# Patient Record
Sex: MALE | Race: BLACK OR AFRICAN AMERICAN | Employment: FULL TIME | URBAN - METROPOLITAN AREA
[De-identification: names, ages, dates, MRNs, and addresses within clinical notes are randomized per-mention and may not be internally consistent; named-entity substitution may affect disease eponyms.]

---

## 2022-08-02 ENCOUNTER — HOSPITAL ENCOUNTER (EMERGENCY)
Age: 32
Discharge: HOME | End: 2022-08-02
Attending: STUDENT IN AN ORGANIZED HEALTH CARE EDUCATION/TRAINING PROGRAM

## 2022-08-02 VITALS
BODY MASS INDEX: 43.07 KG/M2 | TEMPERATURE: 98.1 F | HEART RATE: 89 BPM | RESPIRATION RATE: 16 BRPM | DIASTOLIC BLOOD PRESSURE: 115 MMHG | SYSTOLIC BLOOD PRESSURE: 176 MMHG | HEIGHT: 66 IN | OXYGEN SATURATION: 98 % | WEIGHT: 268 LBS

## 2022-08-02 DIAGNOSIS — L03.317 CELLULITIS OF BUTTOCK: Primary | ICD-10-CM

## 2022-08-02 DIAGNOSIS — R03.0 ELEVATED BLOOD PRESSURE READING: ICD-10-CM

## 2022-08-02 PROCEDURE — 74011250637 HC RX REV CODE- 250/637: Performed by: PHYSICIAN ASSISTANT

## 2022-08-02 PROCEDURE — 99283 EMERGENCY DEPT VISIT LOW MDM: CPT

## 2022-08-02 RX ORDER — SULFAMETHOXAZOLE AND TRIMETHOPRIM 800; 160 MG/1; MG/1
1 TABLET ORAL
Status: COMPLETED | OUTPATIENT
Start: 2022-08-02 | End: 2022-08-02

## 2022-08-02 RX ORDER — CEPHALEXIN 250 MG/1
500 CAPSULE ORAL
Status: COMPLETED | OUTPATIENT
Start: 2022-08-02 | End: 2022-08-02

## 2022-08-02 RX ORDER — CEPHALEXIN 500 MG/1
500 CAPSULE ORAL 2 TIMES DAILY
Qty: 14 CAPSULE | Refills: 0 | Status: SHIPPED | OUTPATIENT
Start: 2022-08-02 | End: 2022-08-09

## 2022-08-02 RX ORDER — ACETAMINOPHEN 500 MG
1000 TABLET ORAL
Status: COMPLETED | OUTPATIENT
Start: 2022-08-02 | End: 2022-08-02

## 2022-08-02 RX ORDER — SULFAMETHOXAZOLE AND TRIMETHOPRIM 800; 160 MG/1; MG/1
1 TABLET ORAL 2 TIMES DAILY
Qty: 14 TABLET | Refills: 0 | Status: SHIPPED | OUTPATIENT
Start: 2022-08-02 | End: 2022-08-09

## 2022-08-02 RX ADMIN — SULFAMETHOXAZOLE AND TRIMETHOPRIM 1 TABLET: 800; 160 TABLET ORAL at 11:18

## 2022-08-02 RX ADMIN — CEPHALEXIN 500 MG: 250 CAPSULE ORAL at 11:18

## 2022-08-02 RX ADMIN — ACETAMINOPHEN 1000 MG: 500 TABLET ORAL at 11:18

## 2022-08-02 NOTE — Clinical Note
[address] [place] [address] Dr [facility] EMERGENCY DEPT  [id number] [address] [phone]    Work/School Note    Date: 8/2/2022    To Whom It May concern:    Jaspreet Dotson was seen and treated today in the emergency room by the following provider(s):  Attending Provider: Rebecca Ceaj DO  Physician Assistant: Kathrine Burnett, Replaced by [facility] John Ibrahim. Jaspreet Dotson is excused from work/school on 08/02/22 and 08/03/22. He is medically clear to return to work/school on 8/4/2022.        Sincerely,          MIGUE Noel

## 2022-08-02 NOTE — ED NOTES
Patient discharge discussed, patient verbalized understanding. Discussed follow up with PCP regarding ED visit and BP management.  Patient ambulatory at discharge, NAD

## 2022-08-02 NOTE — ED TRIAGE NOTES
Patient states that he has a boil on his butt. Patient states he has been dealing with it for 4 days. Patient denies injury.

## 2022-08-02 NOTE — Clinical Note
[address] [place] [address] Dr ONEYDA [facility] EMERGENCY DEPT  [id number] [address] [phone]    Work/School Note    Date: 8/2/2022    To Whom It May concern:    Neha Dotson was seen and treated today in the emergency room by the following provider(s):  Attending Provider: Brenna Salazar DO  Physician Assistant: Ambrosio Cantrell, [facility] Jonh Ibrahim. Neha Dotson is excused from work/school on 08/02/22 and 08/03/22. He is medically clear to return to work/school on 8/4/2022.        Sincerely,          Ana Alvarez PA

## 2022-08-02 NOTE — ED PROVIDER NOTES
EMERGENCY DEPARTMENT HISTORY AND PHYSICAL EXAM    10:14 AM      Date: 8/2/2022  Patient Name: Nader Greene    History of Presenting Illness     Chief Complaint   Patient presents with    Abscess     History Provided By: Patient    Additional History (Context): Nader Greene is a 34 y.o. male with  noted PMH  who presents with complaint of gluteal abscess x4 days. Patient states been applying warm compresses with mild relief. Notes possible drainage from site. Denies fever or chills, abdominal pain, nausea or vomiting. Denies history of diabetes or MRSA. PCP: None    Past History     Past Medical History:  No past medical history on file. Past Surgical History:  No past surgical history on file. Family History:  No family history on file. Social History: Allergies:  No Known Allergies      Review of Systems       Review of Systems   Constitutional:  Negative for chills and fever. Respiratory:  Negative for shortness of breath. Cardiovascular:  Negative for chest pain. Gastrointestinal:  Negative for abdominal pain, nausea and vomiting. Skin:  Positive for color change. Negative for rash. Neurological:  Negative for weakness. All other systems reviewed and are negative. Physical Exam   Visit Vitals  BP (!) 176/115   Pulse 89   Temp 98.1 °F (36.7 °C)   Resp 16   Ht 5' 6\" (1.676 m)   Wt 121.6 kg (268 lb)   SpO2 98%   BMI 43.26 kg/m²         Physical Exam  Vitals and nursing note reviewed. Exam conducted with a chaperone present. Constitutional:       General: He is not in acute distress. Appearance: Normal appearance. He is well-developed. He is not ill-appearing, toxic-appearing or diaphoretic. HENT:      Head: Normocephalic and atraumatic. Cardiovascular:      Rate and Rhythm: Normal rate and regular rhythm. Heart sounds: Normal heart sounds. No murmur heard. No friction rub. No gallop.    Pulmonary:      Effort: Pulmonary effort is normal. No respiratory distress. Breath sounds: Normal breath sounds. No wheezing or rales. Musculoskeletal:         General: Normal range of motion. Cervical back: Normal range of motion and neck supple. Skin:     General: Skin is warm. Findings: No rash. Comments: R gluteal region ~2 cm region of erythema and minimal induration without fluctuance or drainage, moderately TTP    Neurological:      Mental Status: He is alert. Diagnostic Study Results     Labs -  No results found for this or any previous visit (from the past 12 hour(s)). Radiologic Studies -   No orders to display         Medical Decision Making   I am the first provider for this patient. I reviewed the vital signs, available nursing notes, past medical history, past surgical history, family history and social history. Vital Signs-Reviewed the patient's vital signs. Records Reviewed: Nursing Notes and Old Medical Records (Time of Review: 10:14 AM)    ED Course: Progress Notes, Reevaluation, and Consults:  10:14 AM  Reviewed plan with patient. Discussed need for close outpatient follow-up this week for reassessment. Discussed strict return precautions, including fever, increased pain or swelling, or any other medical concerns. One or more blood pressure readings were noted elevated during the patient's presentation in the emergency department today. This abnormal reading has been cited in the patients' diagnosis, and they have been encouraged to follow up with their primary care physician, or referred to a consultant for further evaluation and treatment. Provider Notes (Medical Decision Making): 62-year-old male who presents to the ED due to concern for gluteal abscess. Afebrile, nontoxic-appearing, looks well. Symptoms appear consistent with cellulitis, possible early abscess. No fluctuance or drainage. Do not feel I&D is warranted at this time.   Will discharge with antibiotics, instructions for warm compresses, close outpatient follow-up for further assessment. Strict return precautions provided. Diagnosis     Clinical Impression:   1. Cellulitis of buttock    2. Elevated blood pressure reading        Disposition: home     Follow-up Information       Follow up With Specialties Details Why [address]    [facility] EMERGENCY DEPT Emergency Medicine  If symptoms worsen [address] [address]  [address]  Schedule an appointment as soon as possible for a visit   Milly Mcclure 3  [address]  [phone]             Patient's Medications   Start Taking    CEPHALEXIN (KEFLEX) 500 MG CAPSULE    Take 1 Capsule by mouth two (2) times a day for 7 days. TRIMETHOPRIM-SULFAMETHOXAZOLE (BACTRIM DS) 160-800 MG PER TABLET    Take 1 Tablet by mouth two (2) times a day for 7 days. Continue Taking    No medications on file   These Medications have changed    No medications on file   Stop Taking    No medications on file       Dictation disclaimer:  Please note that this dictation was completed with [employer], the computer voice recognition software. Quite often unanticipated grammatical, syntax, homophones, and other interpretive errors are inadvertently transcribed by the computer software. Please disregard these errors. Please excuse any errors that have escaped final proofreading.

## 2024-11-19 ENCOUNTER — HOSPITAL ENCOUNTER (INPATIENT)
Facility: HOSPITAL | Age: 34
Discharge: HOME | DRG: 066 | End: 2024-11-22

## 2024-11-19 ENCOUNTER — APPOINTMENT (OUTPATIENT)
Facility: HOSPITAL | Age: 34
DRG: 066 | End: 2024-11-19

## 2024-11-19 ENCOUNTER — HOSPITAL ENCOUNTER (INPATIENT)
Facility: HOSPITAL | Age: 34
LOS: 2 days | Discharge: HOME | DRG: 066 | End: 2024-11-21
Attending: EMERGENCY MEDICINE

## 2024-11-19 DIAGNOSIS — I63.9 CEREBROVASCULAR ACCIDENT (CVA), UNSPECIFIED MECHANISM (HCC): ICD-10-CM

## 2024-11-19 DIAGNOSIS — I16.1 HYPERTENSIVE EMERGENCY: ICD-10-CM

## 2024-11-19 DIAGNOSIS — I63.9 ISCHEMIC STROKE (HCC): Primary | ICD-10-CM

## 2024-11-19 PROBLEM — I10 PRIMARY HYPERTENSION: Status: ACTIVE | Noted: 2024-11-19

## 2024-11-19 LAB
ALBUMIN SERPL-MCNC: 4 G/DL (ref 3.4–5)
ALBUMIN/GLOB SERPL: 1.1 (ref 0.8–1.7)
ALP SERPL-CCNC: 96 U/L (ref 45–117)
ALT SERPL-CCNC: 22 U/L (ref 16–61)
ANION GAP SERPL CALC-SCNC: 5 MMOL/L (ref 3–18)
AST SERPL-CCNC: 15 U/L (ref 10–38)
BASOPHILS # BLD: 0 K/UL (ref 0–0.1)
BASOPHILS NFR BLD: 0 % (ref 0–2)
BILIRUB SERPL-MCNC: 0.6 MG/DL (ref 0.2–1)
BUN SERPL-MCNC: 8 MG/DL (ref 7–18)
BUN/CREAT SERPL: 9 (ref 12–20)
CALCIUM SERPL-MCNC: 9.5 MG/DL (ref 8.5–10.1)
CHLORIDE SERPL-SCNC: 109 MMOL/L (ref 100–111)
CHP ED QC CHECK: YES
CO2 SERPL-SCNC: 27 MMOL/L (ref 21–32)
CREAT SERPL-MCNC: 0.93 MG/DL (ref 0.6–1.3)
DIFFERENTIAL METHOD BLD: NORMAL
EKG ATRIAL RATE: 91 BPM
EKG DIAGNOSIS: NORMAL
EKG P AXIS: 40 DEGREES
EKG P-R INTERVAL: 150 MS
EKG Q-T INTERVAL: 372 MS
EKG QRS DURATION: 88 MS
EKG QTC CALCULATION (BAZETT): 457 MS
EKG R AXIS: 76 DEGREES
EKG T AXIS: 83 DEGREES
EKG VENTRICULAR RATE: 91 BPM
EOSINOPHIL # BLD: 0 K/UL (ref 0–0.4)
EOSINOPHIL NFR BLD: 1 % (ref 0–5)
ERYTHROCYTE [DISTWIDTH] IN BLOOD BY AUTOMATED COUNT: 12.7 % (ref 11.6–14.5)
GLOBULIN SER CALC-MCNC: 3.8 G/DL (ref 2–4)
GLUCOSE BLD STRIP.AUTO-MCNC: 111 MG/DL (ref 70–110)
GLUCOSE BLD-MCNC: 111 MG/DL
GLUCOSE SERPL-MCNC: 104 MG/DL (ref 74–99)
HCT VFR BLD AUTO: 45.3 % (ref 36–48)
HGB BLD-MCNC: 15.4 G/DL (ref 13–16)
IMM GRANULOCYTES # BLD AUTO: 0 K/UL (ref 0–0.04)
IMM GRANULOCYTES NFR BLD AUTO: 0 % (ref 0–0.5)
INR PPP: 1 (ref 0.9–1.1)
LYMPHOCYTES # BLD: 1.7 K/UL (ref 0.9–3.6)
LYMPHOCYTES NFR BLD: 26 % (ref 21–52)
MCH RBC QN AUTO: 28.3 PG (ref 24–34)
MCHC RBC AUTO-ENTMCNC: 34 G/DL (ref 31–37)
MCV RBC AUTO: 83.1 FL (ref 78–100)
MONOCYTES # BLD: 0.5 K/UL (ref 0.05–1.2)
MONOCYTES NFR BLD: 7 % (ref 3–10)
NEUTS SEG # BLD: 4.2 K/UL (ref 1.8–8)
NEUTS SEG NFR BLD: 65 % (ref 40–73)
NRBC # BLD: 0 K/UL (ref 0–0.01)
NRBC BLD-RTO: 0 PER 100 WBC
PLATELET # BLD AUTO: 272 K/UL (ref 135–420)
PMV BLD AUTO: 11.3 FL (ref 9.2–11.8)
POTASSIUM SERPL-SCNC: 4.2 MMOL/L (ref 3.5–5.5)
PROT SERPL-MCNC: 7.8 G/DL (ref 6.4–8.2)
PROTHROMBIN TIME: 13.4 SEC (ref 11.9–14.9)
RBC # BLD AUTO: 5.45 M/UL (ref 4.35–5.65)
SODIUM SERPL-SCNC: 141 MMOL/L (ref 136–145)
TROPONIN I SERPL HS-MCNC: 17 NG/L (ref 0–78)
WBC # BLD AUTO: 6.5 K/UL (ref 4.6–13.2)

## 2024-11-19 PROCEDURE — 70496 CT ANGIOGRAPHY HEAD: CPT

## 2024-11-19 PROCEDURE — 85025 COMPLETE CBC W/AUTO DIFF WBC: CPT

## 2024-11-19 PROCEDURE — 70450 CT HEAD/BRAIN W/O DYE: CPT

## 2024-11-19 PROCEDURE — 80053 COMPREHEN METABOLIC PANEL: CPT

## 2024-11-19 PROCEDURE — 99222 1ST HOSP IP/OBS MODERATE 55: CPT | Performed by: PHYSICIAN ASSISTANT

## 2024-11-19 PROCEDURE — 93005 ELECTROCARDIOGRAM TRACING: CPT | Performed by: EMERGENCY MEDICINE

## 2024-11-19 PROCEDURE — 6370000000 HC RX 637 (ALT 250 FOR IP): Performed by: PHYSICIAN ASSISTANT

## 2024-11-19 PROCEDURE — 99285 EMERGENCY DEPT VISIT HI MDM: CPT

## 2024-11-19 PROCEDURE — 82962 GLUCOSE BLOOD TEST: CPT

## 2024-11-19 PROCEDURE — 6360000002 HC RX W HCPCS: Performed by: PHYSICIAN ASSISTANT

## 2024-11-19 PROCEDURE — 6370000000 HC RX 637 (ALT 250 FOR IP): Performed by: EMERGENCY MEDICINE

## 2024-11-19 PROCEDURE — 70551 MRI BRAIN STEM W/O DYE: CPT

## 2024-11-19 PROCEDURE — 1100000003 HC PRIVATE W/ TELEMETRY

## 2024-11-19 PROCEDURE — 70498 CT ANGIOGRAPHY NECK: CPT

## 2024-11-19 PROCEDURE — 84484 ASSAY OF TROPONIN QUANT: CPT

## 2024-11-19 PROCEDURE — 2580000003 HC RX 258: Performed by: EMERGENCY MEDICINE

## 2024-11-19 PROCEDURE — 6360000004 HC RX CONTRAST MEDICATION: Performed by: EMERGENCY MEDICINE

## 2024-11-19 PROCEDURE — 6360000002 HC RX W HCPCS: Performed by: EMERGENCY MEDICINE

## 2024-11-19 PROCEDURE — 93010 ELECTROCARDIOGRAM REPORT: CPT | Performed by: INTERNAL MEDICINE

## 2024-11-19 PROCEDURE — 85610 PROTHROMBIN TIME: CPT

## 2024-11-19 RX ORDER — ONDANSETRON 4 MG/1
4 TABLET, ORALLY DISINTEGRATING ORAL EVERY 8 HOURS PRN
Status: DISCONTINUED | OUTPATIENT
Start: 2024-11-19 | End: 2024-11-21 | Stop reason: HOSPADM

## 2024-11-19 RX ORDER — CLOPIDOGREL 300 MG/1
300 TABLET, FILM COATED ORAL
Status: COMPLETED | OUTPATIENT
Start: 2024-11-19 | End: 2024-11-19

## 2024-11-19 RX ORDER — ASPIRIN 300 MG/1
300 SUPPOSITORY RECTAL DAILY
Status: DISCONTINUED | OUTPATIENT
Start: 2024-11-20 | End: 2024-11-21

## 2024-11-19 RX ORDER — SODIUM CHLORIDE 9 MG/ML
INJECTION, SOLUTION INTRAVENOUS CONTINUOUS
Status: DISCONTINUED | OUTPATIENT
Start: 2024-11-19 | End: 2024-11-19

## 2024-11-19 RX ORDER — ONDANSETRON 2 MG/ML
4 INJECTION INTRAMUSCULAR; INTRAVENOUS EVERY 6 HOURS PRN
Status: DISCONTINUED | OUTPATIENT
Start: 2024-11-19 | End: 2024-11-21 | Stop reason: HOSPADM

## 2024-11-19 RX ORDER — LABETALOL HYDROCHLORIDE 5 MG/ML
10 INJECTION, SOLUTION INTRAVENOUS ONCE
Status: COMPLETED | OUTPATIENT
Start: 2024-11-19 | End: 2024-11-19

## 2024-11-19 RX ORDER — ASPIRIN 300 MG/1
300 SUPPOSITORY RECTAL DAILY
Status: DISCONTINUED | OUTPATIENT
Start: 2024-11-19 | End: 2024-11-19

## 2024-11-19 RX ORDER — ATORVASTATIN CALCIUM 40 MG/1
80 TABLET, FILM COATED ORAL NIGHTLY
Status: DISCONTINUED | OUTPATIENT
Start: 2024-11-19 | End: 2024-11-21 | Stop reason: HOSPADM

## 2024-11-19 RX ORDER — LABETALOL HYDROCHLORIDE 5 MG/ML
10 INJECTION, SOLUTION INTRAVENOUS EVERY 10 MIN PRN
Status: DISCONTINUED | OUTPATIENT
Start: 2024-11-19 | End: 2024-11-21 | Stop reason: HOSPADM

## 2024-11-19 RX ORDER — ASPIRIN 81 MG/1
81 TABLET, CHEWABLE ORAL DAILY
Status: DISCONTINUED | OUTPATIENT
Start: 2024-11-20 | End: 2024-11-21

## 2024-11-19 RX ORDER — ASPIRIN 81 MG/1
81 TABLET, CHEWABLE ORAL DAILY
Status: DISCONTINUED | OUTPATIENT
Start: 2024-11-19 | End: 2024-11-19

## 2024-11-19 RX ORDER — IOPAMIDOL 755 MG/ML
80 INJECTION, SOLUTION INTRAVASCULAR
Status: COMPLETED | OUTPATIENT
Start: 2024-11-19 | End: 2024-11-19

## 2024-11-19 RX ORDER — ACETAMINOPHEN 325 MG/1
650 TABLET ORAL EVERY 4 HOURS PRN
Status: DISCONTINUED | OUTPATIENT
Start: 2024-11-19 | End: 2024-11-21 | Stop reason: HOSPADM

## 2024-11-19 RX ORDER — ENOXAPARIN SODIUM 100 MG/ML
30 INJECTION SUBCUTANEOUS 2 TIMES DAILY
Status: DISCONTINUED | OUTPATIENT
Start: 2024-11-19 | End: 2024-11-21 | Stop reason: HOSPADM

## 2024-11-19 RX ORDER — ASPIRIN 325 MG
325 TABLET ORAL ONCE
Status: COMPLETED | OUTPATIENT
Start: 2024-11-19 | End: 2024-11-19

## 2024-11-19 RX ORDER — POLYETHYLENE GLYCOL 3350 17 G/17G
17 POWDER, FOR SOLUTION ORAL DAILY PRN
Status: DISCONTINUED | OUTPATIENT
Start: 2024-11-19 | End: 2024-11-21 | Stop reason: HOSPADM

## 2024-11-19 RX ADMIN — LABETALOL HYDROCHLORIDE 10 MG: 5 INJECTION, SOLUTION INTRAVENOUS at 14:30

## 2024-11-19 RX ADMIN — LABETALOL HYDROCHLORIDE 10 MG: 5 INJECTION, SOLUTION INTRAVENOUS at 12:51

## 2024-11-19 RX ADMIN — ATORVASTATIN CALCIUM 80 MG: 40 TABLET, FILM COATED ORAL at 21:50

## 2024-11-19 RX ADMIN — IOPAMIDOL 80 ML: 755 INJECTION, SOLUTION INTRAVENOUS at 11:51

## 2024-11-19 RX ADMIN — SODIUM CHLORIDE: 9 INJECTION, SOLUTION INTRAVENOUS at 12:10

## 2024-11-19 RX ADMIN — CLOPIDOGREL BISULFATE 300 MG: 300 TABLET, FILM COATED ORAL at 12:16

## 2024-11-19 RX ADMIN — ASPIRIN 325 MG: 325 TABLET ORAL at 12:16

## 2024-11-19 RX ADMIN — ENOXAPARIN SODIUM 30 MG: 100 INJECTION SUBCUTANEOUS at 21:00

## 2024-11-19 ASSESSMENT — PAIN SCALES - GENERAL
PAINLEVEL_OUTOF10: 0
PAINLEVEL_OUTOF10: 0

## 2024-11-19 ASSESSMENT — LIFESTYLE VARIABLES
HOW MANY STANDARD DRINKS CONTAINING ALCOHOL DO YOU HAVE ON A TYPICAL DAY: PATIENT DOES NOT DRINK
HOW OFTEN DO YOU HAVE A DRINK CONTAINING ALCOHOL: NEVER

## 2024-11-19 NOTE — PROGRESS NOTES
MRI screening form needs to be filled out and faxed to [phone] BEFORE MRI can be scheduled.  If unable to obtain information from patient , [employer] needs to be contacted . If patient is claustrophobic or will needs pain meds, please have ordered in advance in order to facilitate exam.

## 2024-11-19 NOTE — H&P
History and Physical          Subjective     HPI: Lisa Aguilar is a 34 y.o. male with a PMHx of HTN who presented to the ED with c/o LUE numbness that started last night while he was on the toilet associated with aphasia/slurred speech. He states he was playing a game on his phone when he noticed his arm become numb and the dexterity decrease in his hand. He tried to call his family, but he was unable to type on his phone. He tried to say \"ok google\" to try to call someone by voice commands, but he was unable to speak/get the words out so it made sense. When he tried to get up, he felt off balance. Symptoms lasted a few minutes and resolved. He does not have a primary care physician or take any home medications, but was told previously he has HTN. His grandparents and mother had HTN and ESRD on dialysis. He denies vision changes, headache, LE weakness/numbness, vertigo. He does not have a PCP. He drinks occasionally, smokes black and milds.    In the ED, BP elevated up to 223/128. Labs with no acute abnormalities. CTH negative for intracranial abnormality. CTA negative for LVO.     PMHx:  Past Medical History:   Diagnosis Date    Primary hypertension 11/19/2024       PSurgHx:  No past surgical history on file.    SocialHx:  Social History     Socioeconomic History    Marital status: Single   Tobacco Use    Smoking status: Every Day     Types: Cigars    Smokeless tobacco: Never   Vaping Use    Vaping status: Never Used     Social Determinants of Health     Food Insecurity: No Food Insecurity (11/19/2024)    Hunger Vital Sign     Worried About Running Out of Food in the Last Year: Never true     Ran Out of Food in the Last Year: Never true   Transportation Needs: No Transportation Needs (11/19/2024)    PRAPARE - Transportation     Lack of Transportation (Medical): No     Lack of Transportation (Non-Medical): No   Housing Stability: Low Risk  (11/19/2024)    Housing Stability Vital Sign     Unable to Pay for  91 BPM    P-R Interval 150 ms    QRS Duration 88 ms    Q-T Interval 372 ms    QTc Calculation (Bazett) 457 ms    P Axis 40 degrees    R Axis 76 degrees    T Axis 83 degrees    Diagnosis       Normal sinus rhythm  Normal ECG  No previous ECGs available  Confirmed by MD Kevin, Giacomo ([id number]) on 11/19/2024 1:01:58 PM         Imaging Reviewed:  CT HEAD WO CONTRAST    Result Date: 11/19/2024  CT OF THE HEAD WITHOUT CONTRAST CPT CODE: 26537 PROVIDED REASON FOR EXAM: Stroke ADDITIONAL HISTORY: According to the chart notes patient with LEFT upper extremity NUMBNESS. Also reported DIZZINESS. CODE S STROKE ALERT COMPARISON: None TECHNIQUE:  Axial CT images of the head from the skull base to the vertex without IV contrast. CT dose reduction was achieved through use of a standardized protocol tailored for this examination and automatic exposure control for dose modulation. FINDINGS: Negative for large territory acute cortical infarct. No findings of acute intracranial hemorrhage. Overall the brain attenuations appear normal. No findings of mass lesion, no chronic infarct. Normal CSF spaces, no hydrocephalus or abnormal extra-axial fluid collection. Normal craniocervical junction, no findings of Chiari malformation. Partially imaged paranasal sinuses show polypoid density in the right nasal passage that could be sinonasal polyp. Ovoid 18 x 13 mm opacity at the region of the superior aspect of the right maxillary sinus or maxillary infundibulum. Could be a mucous retention cyst or a small mucocele.     1. No acute intracranial abnormality. The brain appears normal. 2. Polypoid density in the right nasal passage that could be a nasal polyp. Rounded density at the region of the right maxillary sinus infundibulum. Could be mucous retention cyst or mucocele, the sinuses are incompletely imaged. Findings called to Dr. Contreras in the emergency department at 12:04 p.m., 11/19/2024. Electronically signed by Earnest Leon    CTA HEAD NECK W WO

## 2024-11-19 NOTE — ED NOTES
TRANSFER - OUT REPORT:    Verbal report given to Lola Joyce RN  on Lisa Aguilar  being transferred to [place] [address] for routine progression of patient care       Report consisted of patient's Situation, Background, Assessment and   Recommendations(SBAR).     Information from the following report(s) Nurse Handoff Report was reviewed with the receiving nurse.    [place] Fall Assessment:    Presents to emergency department  because of falls (Syncope, seizure, or loss of consciousness): Yes  Age > 70: No  Altered Mental Status, Intoxication with alcohol or substance confusion (Disorientation, impaired judgment, poor safety awaremess, or inability to follow instructions): No  Impaired Mobility: Ambulates or transfers with assistive devices or assistance; Unable to ambulate or transer.: No  Nursing Judgement: Yes          Lines:   Peripheral IV 11/19/24 Distal;Right Cephalic (Active)        Opportunity for questions and clarification was provided.      Patient transported with:  Monitor

## 2024-11-19 NOTE — ED PROVIDER NOTES
[facility] EMERGENCY DEPT  EMERGENCY DEPARTMENT HISTORY AND PHYSICAL EXAM      Pt Name: Lisa Aguilar  MRN: [id number]  Birthdate 1990  Date of evaluation: 11/19/2024  Provider: Vic Contreras DO    CHIEF COMPLAINT       Chief Complaint   Patient presents with    Numbness    Dizziness         HISTORY OF PRESENT ILLNESS   (Location/Symptom, Timing/Onset, Context/Setting, Quality, Duration, Modifying Factors, Severity)  Note limiting factors.   Lisa Aguilar is a 34 y.o. male who presents to the emergency department with chief complaint of numbness of his left upper extremity that started at midnight last night while he was on toilet.  He says he had associated dizziness and felt confused.  He reports that the dizziness and confusion have resolved.  Today he has left hand numbness with the arm numbness being resolved.  He says that his sibling and family encouraged him to come to be evaluated last night but he waited until this morning.  He does report that he was told in the past that he has high blood pressure but does not have a primary care doctor and has not seen a doctor in a long time.  No headache, neck pain, and no trauma, or visual changes, chest pain, shortness of breath, leg pain, abdominal pain, nausea, fever, focal weakness, and no other complaints.    The history is provided by the patient. No  was used.       Nursing Notes were reviewed.    REVIEW OF SYSTEMS    (2-9 systems for level 4, 10 or more for level 5)     Review of Systems    Except as noted above in the HPI, the remainder of the review of systems was reviewed and negative.       PAST MEDICAL HISTORY   No past medical history on file.      SURGICAL HISTORY     No past surgical history on file.      CURRENT MEDICATIONS       Previous Medications    No medications on file       ALLERGIES     Patient has no known allergies.    FAMILY HISTORY     No family history on file.       SOCIAL HISTORY       Social History  studies/labs  -re-evaluations  -documentation time    Aggregate critical care time was 53 minutes, which includes only time during which I was engaged in work directly related to the patient's care as described above, whether I was at bedside or elsewhere in the Emergency Department. It did not include time spent performing other reported procedures or the services of residents, students, nurses, or advance practice providers.     ~Dr. Contreras            FINAL IMPRESSION      1. Ischemic stroke (HCC)    2. Hypertensive emergency          DISPOSITION/PLAN   DISPOSITION Admitted 11/19/2024 12:45:36 PM      PATIENT REFERRED TO:  No follow-up provider specified.    DISCHARGE MEDICATIONS:  New Prescriptions    No medications on file     Controlled Substances Monitoring:          No data to display                Dictation disclaimer: Please note that this dictation was completed with [employer], the computer voice recognition software.  Quite often unanticipated grammatical, syntax, homophones, and other interpretive errors are inadvertently transcribed by the computer software.  Please disregard these errors.  Please excuse any errors that have escaped final proofreading.    My signature above authenticates this document and my orders, the final    diagnosis (es), discharge prescription (s), and instructions in the Epic    record.       Vic Contreras DO (electronically signed)  Attending Emergency Physician          Vic Contreras DO  11/19/24 [id number]

## 2024-11-19 NOTE — ED TRIAGE NOTES
Ambulatory pt brought to stroke stretcher for immediate provider evaluation for left upper extremity numbness that started last night and dizziness today.   Code S called at 1136  Taken to CT 1137  Finger stick glucose 111

## 2024-11-19 NOTE — PROGRESS NOTES
4 Eyes Skin Assessment     NAME:  Lisa Aguilar  YOB: 1990  MEDICAL RECORD NUMBER:  [id number]    The patient is being assessed for  Admission    I agree that at least one RN has performed a thorough Head to Toe Skin Assessment on the patient. ALL assessment sites listed below have been assessed.      Areas assessed by both nurses:    Head, Face, Ears, Shoulders, Back, Chest, Arms, Elbows, Hands, Sacrum. Buttock, Coccyx, Ischium, and Legs. Feet and Heels        Does the Patient have a Wound? No noted wound(s)       Nitin Prevention initiated by RN: No  Wound Care Orders initiated by RN: No    Pressure Injury (Stage 3,4, Unstageable, DTI, NWPT, and Complex wounds) if present, place Wound referral order by RN under : No    New Ostomies, if present place, Ostomy referral order under : No     Nurse 1 eSignature: Electronically signed by Mariel Joyce RN on 11/19/24 at 5:29 PM EST    **SHARE this note so that the co-signing nurse can place an eSignature**    Nurse 2 eSignature: {Esignature:[id number]}

## 2024-11-19 NOTE — FLOWSHEET NOTE
11/19/24 1341   Vitals   Pulse 81   Respirations 17   BP (!) 187/123   MAP (Calculated) 144   MAP (mmHg) 139   Oxygen Therapy   SpO2 97 %   Pulse via Oximetry 82 beats per minute   NIH Stroke Scale   Interval Reassessment   Level of Consciousness (1a) 0   LOC Questions (1b) 0   LOC Commands (1c) 0   Best Gaze (2) 0   Visual (3) 0   Facial Palsy (4) 0   Motor Arm, Left (5a) 0   Motor Arm, Right (5b) 0   Motor Leg, Left (6a) 0   Motor Leg, Right (6b) 0   Limb Ataxia (7) 0   Sensory (8) (!) 1   Best Language (9) 0   Dysarthria (10) 0   Extinction and Inattention (11) 0   Total 1     Md AWARE of clinical findings.

## 2024-11-19 NOTE — FLOWSHEET NOTE
11/19/24 1215   Vital Signs   Pulse 96   Respirations 21   BP (!) 197/145   MAP (Calculated) 162   MAP (mmHg) 157   Oxygen Therapy   SpO2 95 %   Pulse via Oximetry 95 beats per minute     Md MADE AWARE OF FINDINGS.

## 2024-11-20 ENCOUNTER — APPOINTMENT (OUTPATIENT)
Facility: HOSPITAL | Age: 34
DRG: 066 | End: 2024-11-20

## 2024-11-20 LAB
ANION GAP SERPL CALC-SCNC: 6 MMOL/L (ref 3–18)
BUN SERPL-MCNC: 8 MG/DL (ref 7–18)
BUN/CREAT SERPL: 8 (ref 12–20)
CALCIUM SERPL-MCNC: 9.1 MG/DL (ref 8.5–10.1)
CHLORIDE SERPL-SCNC: 109 MMOL/L (ref 100–111)
CHOLEST SERPL-MCNC: 134 MG/DL
CO2 SERPL-SCNC: 27 MMOL/L (ref 21–32)
CREAT SERPL-MCNC: 0.99 MG/DL (ref 0.6–1.3)
ECHO AO ASC DIAM: 3.4 CM
ECHO AO ASCENDING AORTA INDEX: 1.54 CM/M2
ECHO AO ROOT DIAM: 3.2 CM
ECHO AO ROOT INDEX: 1.45 CM/M2
ECHO AV AREA PEAK VELOCITY: 2 CM2
ECHO AV AREA VTI: 1.7 CM2
ECHO AV AREA/BSA PEAK VELOCITY: 0.9 CM2/M2
ECHO AV AREA/BSA VTI: 0.8 CM2/M2
ECHO AV MEAN GRADIENT: 2 MMHG
ECHO AV MEAN VELOCITY: 0.7 M/S
ECHO AV PEAK GRADIENT: 4 MMHG
ECHO AV PEAK VELOCITY: 1 M/S
ECHO AV VELOCITY RATIO: 0.6
ECHO AV VTI: 20.2 CM
ECHO BSA: 2.31 M2
ECHO LA VOL A-L A2C: 76 ML (ref 18–58)
ECHO LA VOL A-L A4C: 66 ML (ref 18–58)
ECHO LA VOL BP: 68 ML (ref 18–58)
ECHO LA VOL MOD A2C: 74 ML (ref 18–58)
ECHO LA VOL MOD A4C: 62 ML (ref 18–58)
ECHO LA VOL/BSA BIPLANE: 31 ML/M2 (ref 16–34)
ECHO LA VOLUME AREA LENGTH: 71 ML
ECHO LA VOLUME INDEX A-L A2C: 34 ML/M2 (ref 16–34)
ECHO LA VOLUME INDEX A-L A4C: 30 ML/M2 (ref 16–34)
ECHO LA VOLUME INDEX AREA LENGTH: 32 ML/M2 (ref 16–34)
ECHO LA VOLUME INDEX MOD A2C: 33 ML/M2 (ref 16–34)
ECHO LA VOLUME INDEX MOD A4C: 28 ML/M2 (ref 16–34)
ECHO LV E' LATERAL VELOCITY: 6.43 CM/S
ECHO LV E' SEPTAL VELOCITY: 4.7 CM/S
ECHO LV EDV A2C: 139 ML
ECHO LV EDV A4C: 134 ML
ECHO LV EDV BP: 138 ML (ref 67–155)
ECHO LV EDV INDEX A4C: 61 ML/M2
ECHO LV EDV INDEX BP: 62 ML/M2
ECHO LV EDV NDEX A2C: 63 ML/M2
ECHO LV EJECTION FRACTION A2C: 58 %
ECHO LV EJECTION FRACTION A4C: 56 %
ECHO LV EJECTION FRACTION BIPLANE: 58 % (ref 55–100)
ECHO LV ESV A2C: 58 ML
ECHO LV ESV A4C: 59 ML
ECHO LV ESV BP: 59 ML (ref 22–58)
ECHO LV ESV INDEX A2C: 26 ML/M2
ECHO LV ESV INDEX A4C: 27 ML/M2
ECHO LV ESV INDEX BP: 27 ML/M2
ECHO LV FRACTIONAL SHORTENING: 26 % (ref 28–44)
ECHO LV INTERNAL DIMENSION DIASTOLE INDEX: 2.44 CM/M2
ECHO LV INTERNAL DIMENSION DIASTOLIC: 5.4 CM (ref 4.2–5.9)
ECHO LV INTERNAL DIMENSION SYSTOLIC INDEX: 1.81 CM/M2
ECHO LV INTERNAL DIMENSION SYSTOLIC: 4 CM
ECHO LV IVSD: 1.3 CM (ref 0.6–1)
ECHO LV MASS 2D: 279.8 G (ref 88–224)
ECHO LV MASS INDEX 2D: 126.6 G/M2 (ref 49–115)
ECHO LV POSTERIOR WALL DIASTOLIC: 1.2 CM (ref 0.6–1)
ECHO LV RELATIVE WALL THICKNESS RATIO: 0.44
ECHO LVOT AREA: 3.5 CM2
ECHO LVOT AV VTI INDEX: 0.49
ECHO LVOT DIAM: 2.1 CM
ECHO LVOT MEAN GRADIENT: 1 MMHG
ECHO LVOT PEAK GRADIENT: 1 MMHG
ECHO LVOT PEAK VELOCITY: 0.6 M/S
ECHO LVOT STROKE VOLUME INDEX: 15.4 ML/M2
ECHO LVOT SV: 33.9 ML
ECHO LVOT VTI: 9.8 CM
ECHO MV A VELOCITY: 0.64 M/S
ECHO MV E DECELERATION TIME (DT): 156.9 MS
ECHO MV E VELOCITY: 1.1 M/S
ECHO MV E/A RATIO: 1.72
ECHO MV E/E' LATERAL: 17.11
ECHO MV E/E' RATIO (AVERAGED): 20.26
ECHO MV E/E' SEPTAL: 23.4
ECHO PV MAX VELOCITY: 0.7 M/S
ECHO PV PEAK GRADIENT: 2 MMHG
ECHO RA AREA 4C: 14.5 CM2
ECHO RV BASAL DIMENSION: 4 CM
ECHO RV TAPSE: 2.1 CM (ref 1.7–?)
ERYTHROCYTE [DISTWIDTH] IN BLOOD BY AUTOMATED COUNT: 12.7 % (ref 11.6–14.5)
EST. AVERAGE GLUCOSE BLD GHB EST-MCNC: 128 MG/DL
GLUCOSE SERPL-MCNC: 124 MG/DL (ref 74–99)
HBA1C MFR BLD: 6.1 % (ref 4.2–5.6)
HCT VFR BLD AUTO: 43.2 % (ref 36–48)
HDLC SERPL-MCNC: 21 MG/DL (ref 40–60)
HDLC SERPL: 6.4 (ref 0–5)
HGB BLD-MCNC: 14.1 G/DL (ref 13–16)
LDLC SERPL CALC-MCNC: 83.6 MG/DL (ref 0–100)
LIPID PANEL: ABNORMAL
MCH RBC QN AUTO: 27.7 PG (ref 24–34)
MCHC RBC AUTO-ENTMCNC: 32.6 G/DL (ref 31–37)
MCV RBC AUTO: 84.9 FL (ref 78–100)
NRBC # BLD: 0 K/UL (ref 0–0.01)
NRBC BLD-RTO: 0 PER 100 WBC
PLATELET # BLD AUTO: 218 K/UL (ref 135–420)
PMV BLD AUTO: 12.3 FL (ref 9.2–11.8)
POTASSIUM SERPL-SCNC: 3.3 MMOL/L (ref 3.5–5.5)
RBC # BLD AUTO: 5.09 M/UL (ref 4.35–5.65)
SODIUM SERPL-SCNC: 142 MMOL/L (ref 136–145)
TRIGL SERPL-MCNC: 147 MG/DL
VLDLC SERPL CALC-MCNC: 29.4 MG/DL
WBC # BLD AUTO: 6.4 K/UL (ref 4.6–13.2)

## 2024-11-20 PROCEDURE — 93306 TTE W/DOPPLER COMPLETE: CPT | Performed by: INTERNAL MEDICINE

## 2024-11-20 PROCEDURE — 99232 SBSQ HOSP IP/OBS MODERATE 35: CPT | Performed by: INTERNAL MEDICINE

## 2024-11-20 PROCEDURE — 80061 LIPID PANEL: CPT

## 2024-11-20 PROCEDURE — 6370000000 HC RX 637 (ALT 250 FOR IP): Performed by: PHYSICIAN ASSISTANT

## 2024-11-20 PROCEDURE — 36415 COLL VENOUS BLD VENIPUNCTURE: CPT

## 2024-11-20 PROCEDURE — 92526 ORAL FUNCTION THERAPY: CPT

## 2024-11-20 PROCEDURE — 97165 OT EVAL LOW COMPLEX 30 MIN: CPT

## 2024-11-20 PROCEDURE — 85027 COMPLETE CBC AUTOMATED: CPT

## 2024-11-20 PROCEDURE — 94761 N-INVAS EAR/PLS OXIMETRY MLT: CPT

## 2024-11-20 PROCEDURE — 1100000003 HC PRIVATE W/ TELEMETRY

## 2024-11-20 PROCEDURE — 93306 TTE W/DOPPLER COMPLETE: CPT

## 2024-11-20 PROCEDURE — 83036 HEMOGLOBIN GLYCOSYLATED A1C: CPT

## 2024-11-20 PROCEDURE — 6370000000 HC RX 637 (ALT 250 FOR IP): Performed by: INTERNAL MEDICINE

## 2024-11-20 PROCEDURE — 6360000002 HC RX W HCPCS: Performed by: PHYSICIAN ASSISTANT

## 2024-11-20 PROCEDURE — 80048 BASIC METABOLIC PNL TOTAL CA: CPT

## 2024-11-20 PROCEDURE — 92610 EVALUATE SWALLOWING FUNCTION: CPT

## 2024-11-20 RX ORDER — POTASSIUM CHLORIDE 1500 MG/1
40 TABLET, EXTENDED RELEASE ORAL ONCE
Status: COMPLETED | OUTPATIENT
Start: 2024-11-20 | End: 2024-11-20

## 2024-11-20 RX ORDER — NIFEDIPINE 30 MG/1
30 TABLET, EXTENDED RELEASE ORAL DAILY
Status: DISCONTINUED | OUTPATIENT
Start: 2024-11-20 | End: 2024-11-21 | Stop reason: HOSPADM

## 2024-11-20 RX ADMIN — ACETAMINOPHEN 325MG 650 MG: 325 TABLET ORAL at 21:22

## 2024-11-20 RX ADMIN — NIFEDIPINE 30 MG: 30 TABLET, FILM COATED, EXTENDED RELEASE ORAL at 10:41

## 2024-11-20 RX ADMIN — ENOXAPARIN SODIUM 30 MG: 100 INJECTION SUBCUTANEOUS at 09:31

## 2024-11-20 RX ADMIN — ENOXAPARIN SODIUM 30 MG: 100 INJECTION SUBCUTANEOUS at 21:23

## 2024-11-20 RX ADMIN — POTASSIUM CHLORIDE 40 MEQ: 1500 TABLET, EXTENDED RELEASE ORAL at 18:01

## 2024-11-20 RX ADMIN — ATORVASTATIN CALCIUM 80 MG: 40 TABLET, FILM COATED ORAL at 21:23

## 2024-11-20 RX ADMIN — ASPIRIN 81 MG CHEWABLE TABLET 81 MG: 81 TABLET CHEWABLE at 09:31

## 2024-11-20 ASSESSMENT — PAIN DESCRIPTION - DESCRIPTORS: DESCRIPTORS: ACHING

## 2024-11-20 ASSESSMENT — PAIN SCALES - GENERAL
PAINLEVEL_OUTOF10: 0
PAINLEVEL_OUTOF10: 0
PAINLEVEL_OUTOF10: 6
PAINLEVEL_OUTOF10: 0

## 2024-11-20 ASSESSMENT — PAIN DESCRIPTION - FREQUENCY: FREQUENCY: INTERMITTENT

## 2024-11-20 ASSESSMENT — PAIN - FUNCTIONAL ASSESSMENT: PAIN_FUNCTIONAL_ASSESSMENT: ACTIVITIES ARE NOT PREVENTED

## 2024-11-20 ASSESSMENT — PAIN DESCRIPTION - PAIN TYPE: TYPE: ACUTE PAIN

## 2024-11-20 ASSESSMENT — PAIN DESCRIPTION - ONSET: ONSET: ON-GOING

## 2024-11-20 ASSESSMENT — PAIN DESCRIPTION - ORIENTATION: ORIENTATION: LEFT

## 2024-11-20 NOTE — CONSULTS
[facility]  Department of Neurology  Chief Complaint   Patient presents with    Numbness    Dizziness       HPI:   HPI: Lisa Aguilar is a 34 y.o. male with a PMHx of HTN not on any antihypertensives who came to hospital for recurrent numbness at left hand/fingers. Around 11:30 pm Monday, patient finished shower and toilet. He felt numbness and tingling at left hand/fingers when he played games after he came out of the bathroom. He tried to use his right hand to call, but his right hand became clumsy. Finally he called to his sister who think patient may have stroke, and suggest hospital visit. He then called his child mother to send him to hospital. She put her baby to sleep and then came to his place, but patient already fell asleep. Around 10 am of admission day, patient woke up without any symptoms. He drove out and had recurrent numbness at left hand/fingers. He decided to come to hospital directly, where he was found hypertensive 223/128.      All symptoms last for less than one hour and resolved completely. He is asymptomatic now. CTH negative for intracranial abnormality. CTA negative for LVO.     Past Medical History:   Diagnosis Date    Primary hypertension 11/19/2024       No past surgical history on file.    Current Facility-Administered Medications   Medication Dose Route Frequency Provider Last Rate Last Admin    NIFEdipine (PROCARDIA XL) extended release tablet 30 mg  30 mg Oral Daily Josiah Carr MD   30 mg at 11/20/24 1041    ondansetron (ZOFRAN-ODT) disintegrating tablet 4 mg  4 mg Oral Q8H PRN Misty Pryor PA        Or    ondansetron (ZOFRAN) injection 4 mg  4 mg IntraVENous Q6H PRN Misty Pryor PA        polyethylene glycol (GLYCOLAX) packet 17 g  17 g Oral Daily PRN Misty Pryor PA        enoxaparin Sodium (LOVENOX) injection 30 mg  30 mg SubCUTAneous BID Misty Pryor PA   30 mg at 11/20/24 0931    atorvastatin (LIPITOR) tablet 80 mg  80

## 2024-11-20 NOTE — PLAN OF CARE
Problem: Chronic Conditions and Co-morbidities  Goal: Patient's chronic conditions and co-morbidity symptoms are monitored and maintained or improved  11/20/2024 0152 by Chelle Palmer RN  Outcome: Progressing  11/19/2024 2019 by Mariel Joyce RN  Outcome: Progressing  Flowsheets (Taken 11/19/2024 1513)  Care Plan - Patient's Chronic Conditions and Co-Morbidity Symptoms are Monitored and Maintained or Improved:   Monitor and assess patient's chronic conditions and comorbid symptoms for stability, deterioration, or improvement   Collaborate with multidisciplinary team to address chronic and comorbid conditions and prevent exacerbation or deterioration   Update acute care plan with appropriate goals if chronic or comorbid symptoms are exacerbated and prevent overall improvement and discharge     Problem: Discharge Planning  Goal: Discharge to home or other facility with appropriate resources  11/20/2024 0152 by Chelle Palmer RN  Outcome: Progressing  11/19/2024 2019 by Mariel Joyce RN  Outcome: Progressing  Flowsheets (Taken 11/19/2024 1513)  Discharge to home or other facility with appropriate resources:   Identify barriers to discharge with patient and caregiver   Arrange for needed discharge resources and transportation as appropriate   Identify discharge learning needs (meds, wound care, etc)     Problem: Cardiovascular - Adult  Goal: Maintains optimal cardiac output and hemodynamic stability  11/20/2024 0152 by Chelle Palmer RN  Outcome: Progressing  11/19/2024 2019 by Mariel Joyce RN  Outcome: Progressing  Flowsheets (Taken 11/19/2024 1513)  Maintains optimal cardiac output and hemodynamic stability:   Monitor blood pressure and heart rate   Monitor urine output and notify Licensed Independent Practitioner for values outside of normal range   Assess for signs of decreased cardiac output  Goal: Absence of cardiac dysrhythmias or at baseline  11/20/2024 0152 by Chelle Palmer RN  Outcome: Progressing  11/19/2024

## 2024-11-20 NOTE — PLAN OF CARE
Problem: Chronic Conditions and Co-morbidities  Goal: Patient's chronic conditions and co-morbidity symptoms are monitored and maintained or improved  Outcome: Progressing  Flowsheets (Taken 11/19/2024 1513)  Care Plan - Patient's Chronic Conditions and Co-Morbidity Symptoms are Monitored and Maintained or Improved:   Monitor and assess patient's chronic conditions and comorbid symptoms for stability, deterioration, or improvement   Collaborate with multidisciplinary team to address chronic and comorbid conditions and prevent exacerbation or deterioration   Update acute care plan with appropriate goals if chronic or comorbid symptoms are exacerbated and prevent overall improvement and discharge     Problem: Discharge Planning  Goal: Discharge to home or other facility with appropriate resources  Outcome: Progressing  Flowsheets (Taken 11/19/2024 1513)  Discharge to home or other facility with appropriate resources:   Identify barriers to discharge with patient and caregiver   Arrange for needed discharge resources and transportation as appropriate   Identify discharge learning needs (meds, wound care, etc)     Problem: Cardiovascular - Adult  Goal: Maintains optimal cardiac output and hemodynamic stability  Outcome: Progressing  Flowsheets (Taken 11/19/2024 1513)  Maintains optimal cardiac output and hemodynamic stability:   Monitor blood pressure and heart rate   Monitor urine output and notify Licensed Independent Practitioner for values outside of normal range   Assess for signs of decreased cardiac output  Goal: Absence of cardiac dysrhythmias or at baseline  Outcome: Progressing  Flowsheets (Taken 11/19/2024 1513)  Absence of cardiac dysrhythmias or at baseline:   Monitor cardiac rate and rhythm   Assess for signs of decreased cardiac output

## 2024-11-20 NOTE — PLAN OF CARE
Problem: SLP Adult - Impaired Swallowing  Goal: By Discharge: Advance to least restrictive diet without signs or symptoms of aspiration for planned discharge setting.  See evaluation for individualized goals.  Description: Patient will:  1. Tolerate PO trials with 0 s/s overt distress in 4/5 trials - met  2. Utilize compensatory swallow strategies/maneuvers (decrease bite/sip, size/rate, alt. liq/sol) with min cues in 4/5 trials - met    Rec:     Regular diet with thin liquids  Aspiration precautions  HOB >45 during po intake, remain >30 for 30-45 minutes after po   Small bites/sips; alternate liquid/solid with slow feeding rate   Oral care TID  Meds per pt preference    Outcome: Completed  SPEECH LANGUAGE PATHOLOGY BEDSIDE SWALLOW EVALUATION AND DISCHARGE    Patient: Lisa Aguilar (34 y.o. male)  Date: 11/20/2024  Primary Diagnosis: Hypertensive emergency [I16.1]  Ischemic stroke (HCC) [I63.9]       Precautions: Aspiration   PLOF: As per H&P    ASSESSMENT:  Based on the objective data described below, the patient presents with oropharyngeal swallow fxn essentially WFL. Strength, ROM, and coordination of the orofacial musculature were all found to be WFL. Pt tolerated reg solid and thin liquids +/- straw consecutive swallows without any overt s/sx of aspiration. Mastication was appropriate with timely a-p transit. Positive oral clearance observed across all trials. Laryngeal elevation was functional/timely to palpation with all PO trials. Pt safe for reg solid diet with thin liquids, aspiration precautions, oral care TID, and meds as tolerated.     TREATMENT:  Skilled therapy initiated; Educated patient on aspiration precautions and importance of compensatory swallow techniques to decrease aspiration risk (decrease rate of intake & sip/bite size, upright @HOB for all po intake and ~30 minutes after po); verbalized comprehension. Skilled SLP intervention is not indicated. Please re-consult as needed.

## 2024-11-20 NOTE — CARE COORDINATION
11/20/24 0950   Service Assessment   Patient Orientation Alert and Oriented   Cognition Alert   History Provided By Patient   Primary Caregiver Self   Accompanied By/Relationship No one at bedside   Support Systems Family Members;Friends/Neighbors   Patient's Healthcare Decision Maker is: Patient Declined (Legal Next of Kin Remains as Decision Maker)   PCP Verified by CM No  (Patient needs PCP)   Prior Functional Level Independent in ADLs/IADLs   Current Functional Level Independent in ADLs/IADLs   Can patient return to prior living arrangement Yes   Ability to make needs known: Good   Family able to assist with home care needs: Yes   Would you like for me to discuss the discharge plan with any other family members/significant others, and if so, who? Yes  (Den Aguilar- uncle)   Financial Resources None   Community Resources None   CM/SW Referral Other (see comment)  (not applicable)   Social/Functional History   Lives With Family  (sister)   Type of Home Apartment   Home Layout One level   Home Access Level entry   Bathroom Shower/Tub Tub/Shower unit   Bathroom Toilet Standard   Bathroom Equipment None   Bathroom Accessibility Accessible   Home Equipment None   Receives Help From Family   ADL Assistance Independent   Homemaking Assistance Independent   Homemaking Responsibilities Yes   Ambulation Assistance Independent   Transfer Assistance Independent   Active  Yes   Mode of Transportation Car   Education   (not applicable)   Occupation Full time employment   Type of Occupation    Discharge Planning   Type of Residence Apartment   Living Arrangements Family Members   Current Services Prior To Admission None   Potential Assistance Needed N/A   DME Ordered? No   Potential Assistance Purchasing Medications Yes   Type of Home Care Services None   Patient expects to be discharged to: Apartment   Follow Up Appointment: Best Day/Time  Monday AM   One/Two Story Residence One story    History of falls? 0   Services At/After Discharge   Transition of Care Consult (CM Consult) N/A   Services At/After Discharge None    Resource Information Provided? No   Mode of Transport at Discharge Other (see comment)  (Family to transport)   Hospital Transport Time of Discharge   (to be determined on day of discharge)   Confirm Follow Up Transport Family   Condition of Participation: Discharge Planning   The Plan for Transition of Care is related to the following treatment goals: Disposition is home with available resources.   The Patient and/or Patient Representative was provided with a Choice of Provider? Patient   The Patient and/Or Patient Representative agree with the Discharge Plan? Yes   Freedom of Choice list was provided with basic dialogue that supports the patient's individualized plan of care/goals, treatment preferences, and shares the quality data associated with the providers?  Yes  (Patient declined services)     Natasha HERRERA, RN   Case Management   [phone]

## 2024-11-20 NOTE — PROGRESS NOTES
4 Eyes Skin Assessment     NAME:  Lisa Aguilar  YOB: 1990  MEDICAL RECORD NUMBER:  [id number]    The patient is being assessed for  {Reason for Assessment:54739}    I agree that at least one RN has performed a thorough Head to Toe Skin Assessment on the patient. ALL assessment sites listed below have been assessed.      Areas assessed by both nurses:    Head, Face, Ears, Shoulders, Back, Chest, Arms, Elbows, Hands, Sacrum. Buttock, Coccyx, Ischium, and Legs. Feet and Heels        Does the Patient have a Wound? No noted wound(s)       Nitin Prevention initiated by RN: No  Wound Care Orders initiated by RN: No    Pressure Injury (Stage 3,4, Unstageable, DTI, NWPT, and Complex wounds) if present, place Wound referral order by RN under : No    New Ostomies, if present place, Ostomy referral order under : No     Nurse 1 eSignature: Electronically signed by Chelle Palmer RN on 11/20/24 at 8:13 AM EST    **SHARE this note so that the co-signing nurse can place an eSignature**    Nurse 2 eSignature: {Esignature:[id number]}

## 2024-11-20 NOTE — PROGRESS NOTES
Hospitalist Progress Note    NAME:  Lisa Aguilar   :   1990   MRN:   [id number]     Date/Time:  2024  Subjective:   Chief Complaint: Left arm numbness trouble speaking    Patient is doing better today his symptoms have improved.  Pressure still elevated starting low-dose Procardia;          Review of Systems:  Y  N       Y  N  []   [x]    Fever/chills                                               []   [x]    Chest Pain  []   [x]    Cough                                                       []   [x]    Diarrhea   []   [x]    Sputum                                                     []   [x]    Constipation  []   [x]    SOB/HOLLOWAY                                                []   [x]    Nausea/Vomit  []   [x]    Abd Pain                                                    []   [x]    Tolerating PT  []   [x]    Dysuria                                                      []   [x]    Tolerating Diet     []  Unable to obtain  ROS due to  []  mental status change  []  sedated   []  intubated     Past Med History and Social history reviewed. No changes.   [x]  Current Medication list and allergies reviewed*    Objective:   Vitals:  BP (!) 154/87   Pulse 84   Temp 98.2 °F (36.8 °C) (Oral)   Resp 18   Ht 1.676 m (5' 6\")   Wt 114.8 kg (253 lb)   SpO2 97%   BMI 40.84 kg/m²   Temp (24hrs), Av.1 °F (36.7 °C), Min:97.5 °F (36.4 °C), Max:98.4 °F (36.9 °C)      Last 24hr Input/Output:    Intake/Output Summary (Last 24 hours) at 2024 1621  Last data filed at 2024 1541  Gross per 24 hour   Intake 1540 ml   Output --   Net 1540 ml        PHYSICAL EXAM:  Gen:  []  WD [x]  WN  []  cachectic  []  thin  [x]  obese  []  disheveled             []   Critically ill or  []  Chronically ill appearing    HEENT:   [x]   red/pink conjunctivae []  pale conjunctivae                  PERRL  []  yes  []  no        hearing intact to voice []  yes  []  No               [x]  moist or  []  dry  Mucosa  NECK:   supple  white matter foci.  3.  Mucus retention cysts and/or polyps in the maxillary sinuses.    Continue aspirin high-dose statin.    Accelerated hypertension continue low-dose Procardia XL;  Awaiting echo.;    Hold discharge today.      ___________________________________________________    Total time spent with patient:     minutes    []  Critical Care time:      minutes    Care Plan discussed with:    [x]  Patient   []  Family    [x]  Care Manager  [x]  Nursing   [x]  Consultant/Specialist :      []    >50% of visit spent in counseling and coordination of care   (Discussed []  CODE status,  []  Care Plan, []  D/C Planning)    Prophylaxis:  [x]  Lovenox  []  Coumadin  []  Hep SQ  []  SCD’s  []  H2B/PPI    Disposition:  [x]  Home w/ Family   []   PT,OT,RN   []  SNF/LTC   []  SAH/Rehab  ___________________________________________________    Hospitalist: Josiah Carr MD     ___________________________________________________    *Medications reviewed:  Current Facility-Administered Medications   Medication Dose Route Frequency    NIFEdipine (PROCARDIA XL) extended release tablet 30 mg  30 mg Oral Daily    ondansetron (ZOFRAN-ODT) disintegrating tablet 4 mg  4 mg Oral Q8H PRN    Or    ondansetron (ZOFRAN) injection 4 mg  4 mg IntraVENous Q6H PRN    polyethylene glycol (GLYCOLAX) packet 17 g  17 g Oral Daily PRN    enoxaparin Sodium (LOVENOX) injection 30 mg  30 mg SubCUTAneous BID    atorvastatin (LIPITOR) tablet 80 mg  80 mg Oral Nightly    labetalol (NORMODYNE;TRANDATE) injection 10 mg  10 mg IntraVENous Q10 Min PRN    aspirin chewable tablet 81 mg  81 mg Oral Daily    Or    aspirin suppository 300 mg  300 mg Rectal Daily    acetaminophen (TYLENOL) tablet 650 mg  650 mg Oral Q4H PRN

## 2024-11-20 NOTE — PROGRESS NOTES
Occupational Therapy    OCCUPATIONAL THERAPY EVALUATION/DISCHARGE    Patient: Lisa Aguilar (34 y.o. male)  Date: 11/20/2024  Primary Diagnosis: Hypertensive emergency [I16.1]  Ischemic stroke (HCC) [I63.9]       Precautions: General Precautions,   PLOF: Pt lives w/ family in a one story home, is independent w/ ADLs and functional mobility, works full time as a       ASSESSMENT AND RECOMMENDATIONS:  Pt seated EOB and agreeable to OT evaluation. Pt reports symptoms of LUE numbness and aphasia that he was admitted with have resolved, and pt denies pain. BUE strength grossly 5/5. BUE coordination WNL. BUE sensation intact. Pt ambulating around room independently while self feeding independently. Pt w/ no further OT needs at this time as he appears to be at baseline level of function. Pt left seated EOB, all needs in reach.     Maximum therapeutic gains met at current level of care and patient will be discharged from occupational therapy at this time.    Further Equipment Recommendations for Discharge: None    [facility]: AM-PAC Inpatient Daily Activity Raw Score: 24    At this time and based on an AM-PAC score, no further OT is recommended upon discharge.  Recommend patient returns to prior setting with prior services.    This [facility] score should be considered in conjunction with interdisciplinary team recommendations to determine the most appropriate discharge setting. Patient's social support, diagnosis, medical stability, and prior level of function should also be taken into consideration.     SUBJECTIVE:   Patient stated “I hope I can go home today.”    OBJECTIVE DATA SUMMARY:     Past Medical History:   Diagnosis Date    Primary hypertension 11/19/2024   No past surgical history on file.    Home Situation:   Social/Functional History  Lives With: Family  Type of Home: Apartment  Home Layout: One level  Home Access: Level entry  Bathroom Shower/Tub: Tub/Shower unit  Bathroom Toilet:

## 2024-11-20 NOTE — PROGRESS NOTES
PT orders received and chart reviewed. Seated EOB. Denies mobility concerns. Reports admitting symptoms resolved. Formal PT evaluation not indicated. Discontinuing PT orders.

## 2024-11-20 NOTE — PROGRESS NOTES
4 Eyes Skin Assessment     NAME:  Lisa Aguilar  YOB: 1990  MEDICAL RECORD NUMBER:  [id number]    The patient is being assessed for  Shift Handoff    I agree that at least one RN has performed a thorough Head to Toe Skin Assessment on the patient. ALL assessment sites listed below have been assessed.      Areas assessed by both nurses:    Head, Face, Ears, Shoulders, Back, Chest, Arms, Elbows, Hands, Sacrum. Buttock, Coccyx, Ischium, Legs. Feet and Heels, and Under Medical Devices         Does the Patient have a Wound? No noted wound(s)       Nitin Prevention initiated by RN: No  Wound Care Orders initiated by RN: No    Pressure Injury (Stage 3,4, Unstageable, DTI, NWPT, and Complex wounds) if present, place Wound referral order by RN under : No    New Ostomies, if present place, Ostomy referral order under : No     Nurse 1 eSignature: Electronically signed by Wanda Washington RN on 11/20/24 at 6:30 PM EST    **SHARE this note so that the co-signing nurse can place an eSignature**    Nurse 2 eSignature: Electronically signed by Zoey Mendiloa RN on 11/20/24 at 7:20 PM EST

## 2024-11-20 NOTE — PLAN OF CARE
Problem: Cardiovascular - Adult  Goal: Maintains optimal cardiac output and hemodynamic stability  11/20/2024 0817 by Chelle Palmer RN  Flowsheets (Taken 11/19/2024 1513 by Mariel Joyce RN)  Maintains optimal cardiac output and hemodynamic stability:   Monitor blood pressure and heart rate   Monitor urine output and notify Licensed Independent Practitioner for values outside of normal range   Assess for signs of decreased cardiac output  11/20/2024 0152 by Chelle Palmer RN  Outcome: Progressing  11/19/2024 2019 by Mariel Joyce RN  Outcome: Progressing  Flowsheets (Taken 11/19/2024 1513)  Maintains optimal cardiac output and hemodynamic stability:   Monitor blood pressure and heart rate   Monitor urine output and notify Licensed Independent Practitioner for values outside of normal range   Assess for signs of decreased cardiac output  Goal: Absence of cardiac dysrhythmias or at baseline  11/20/2024 0152 by Chelle Palmer RN  Outcome: Progressing  11/19/2024 2019 by Mariel Joyce RN  Outcome: Progressing  Flowsheets (Taken 11/19/2024 1513)  Absence of cardiac dysrhythmias or at baseline:   Monitor cardiac rate and rhythm   Assess for signs of decreased cardiac output     Problem: Neurosensory - Adult  Goal: Achieves stable or improved neurological status  Outcome: Progressing  Flowsheets (Taken 11/20/2024 0817)  Achieves stable or improved neurological status:   Assess for and report changes in neurological status   Initiate measures to prevent increased intracranial pressure   Maintain blood pressure and fluid volume within ordered parameters to optimize cerebral perfusion and minimize risk of hemorrhage   Monitor temperature, glucose, and sodium. Initiate appropriate interventions as ordered     Problem: Pain  Goal: Verbalizes/displays adequate comfort level or baseline comfort level  11/20/2024 0817 by Chelle Palmer RN  Flowsheets (Taken 11/20/2024 0817)  Verbalizes/displays adequate comfort level or baseline

## 2024-11-20 NOTE — PROGRESS NOTES
4 Eyes Skin Assessment     NAME:  Lisa Aguilar  YOB: 1990  MEDICAL RECORD NUMBER:  [id number]    The patient is being assessed for  Shift Handoff    I agree that at least one RN has performed a thorough Head to Toe Skin Assessment on the patient. ALL assessment sites listed below have been assessed.      Areas assessed by both nurses:    Head, Face, Ears, Shoulders, Back, Chest, Arms, Elbows, Hands, Sacrum. Buttock, Coccyx, Ischium, and Legs. Feet and Heels        Does the Patient have a Wound? No noted wound(s)       Nitin Prevention initiated by RN: No  Wound Care Orders initiated by RN: No    Pressure Injury (Stage 3,4, Unstageable, DTI, NWPT, and Complex wounds) if present, place Wound referral order by RN under : No    New Ostomies, if present place, Ostomy referral order under : No     Nurse 1 eSignature: Electronically signed by Mariel Joyce RN on 11/19/24 at 8:15 PM EST    **SHARE this note so that the co-signing nurse can place an eSignature**    Nurse 2 eSignature: {Esignature:[id number]}

## 2024-11-20 NOTE — PROGRESS NOTES
Spiritual Health History and Assessment/Progress Note  [facility]    Spiritual/Emotional Needs,  ,  ,      Name: Lisa Aguilar MRN: [id number]    Age: 34 y.o.     Sex: male   Language: English   Confucianist: Other   Ischemic stroke (HCC)     Date: 11/20/2024            Total Time Calculated: 7 min              Spiritual Assessment began in [facility]        Referral/Consult From: Rounding   Encounter Overview/Reason: Spiritual/Emotional Needs  Service Provided For: Patient    Lisa, Belief, Meaning:   Patient has beliefs or practices that help with coping during difficult times  Family/Friends No family/friends present      Importance and Influence:  Patient has spiritual/personal beliefs that influence decisions regarding their health  Family/Friends No family/friends present    Community:  Patient feels well-supported. Support system includes: Extended family  Family/Friends No family/friends present    Assessment and Plan of Care:     Patient Interventions include: Facilitated expression of thoughts and feelings  Family/Friends Interventions include: No family/friends present    Patient Plan of Care: No spiritual needs identified for follow-up  Family/Friends Plan of Care: No family/friends present    Electronically signed by ROBERTA Leslie on 11/20/2024 at 6:25 PM

## 2024-11-20 NOTE — PLAN OF CARE
Problem: Chronic Conditions and Co-morbidities  Goal: Patient's chronic conditions and co-morbidity symptoms are monitored and maintained or improved  11/20/2024 1753 by Wanda Washington RN  Outcome: Progressing  Flowsheets (Taken 11/19/2024 1513 by Mariel Joyce RN)  Care Plan - Patient's Chronic Conditions and Co-Morbidity Symptoms are Monitored and Maintained or Improved:   Monitor and assess patient's chronic conditions and comorbid symptoms for stability, deterioration, or improvement   Collaborate with multidisciplinary team to address chronic and comorbid conditions and prevent exacerbation or deterioration   Update acute care plan with appropriate goals if chronic or comorbid symptoms are exacerbated and prevent overall improvement and discharge  11/20/2024 0817 by Chelle Palmer RN  Outcome: Progressing  Flowsheets (Taken 11/19/2024 1513 by Mariel Joyce RN)  Care Plan - Patient's Chronic Conditions and Co-Morbidity Symptoms are Monitored and Maintained or Improved:   Monitor and assess patient's chronic conditions and comorbid symptoms for stability, deterioration, or improvement   Collaborate with multidisciplinary team to address chronic and comorbid conditions and prevent exacerbation or deterioration   Update acute care plan with appropriate goals if chronic or comorbid symptoms are exacerbated and prevent overall improvement and discharge  Note: Monitor pt's bp and administer bp medication.  11/20/2024 0814 by Chelle Palmer RN  Outcome: Progressing  11/20/2024 0728 by Chelle Palmer RN  Flowsheets (Taken 11/19/2024 1513 by Mariel Joyce RN)  Care Plan - Patient's Chronic Conditions and Co-Morbidity Symptoms are Monitored and Maintained or Improved:   Monitor and assess patient's chronic conditions and comorbid symptoms for stability, deterioration, or improvement   Collaborate with multidisciplinary team to address chronic and comorbid conditions and prevent exacerbation or deterioration   Update acute  replacement as ordered     Problem: Pain  Goal: Verbalizes/displays adequate comfort level or baseline comfort level  11/20/2024 1753 by Wanda Washington RN  Outcome: Progressing  Flowsheets (Taken 11/20/2024 0817 by Chelle Palmer RN)  Verbalizes/displays adequate comfort level or baseline comfort level:   Assess pain using appropriate pain scale   Encourage patient to monitor pain and request assistance   Implement non-pharmacological measures as appropriate and evaluate response  11/20/2024 0817 by Chelle Palmer RN  Flowsheets (Taken 11/20/2024 0817)  Verbalizes/displays adequate comfort level or baseline comfort level:   Assess pain using appropriate pain scale   Encourage patient to monitor pain and request assistance   Implement non-pharmacological measures as appropriate and evaluate response     Problem: Neurosensory - Adult  Goal: Achieves stable or improved neurological status  11/20/2024 1753 by Wanda Washington RN  Outcome: Progressing  Flowsheets (Taken 11/20/2024 0817 by Chelle Palmer RN)  Achieves stable or improved neurological status:   Assess for and report changes in neurological status   Initiate measures to prevent increased intracranial pressure   Maintain blood pressure and fluid volume within ordered parameters to optimize cerebral perfusion and minimize risk of hemorrhage   Monitor temperature, glucose, and sodium. Initiate appropriate interventions as ordered  11/20/2024 0817 by Chelle Palmer RN  Outcome: Progressing  Flowsheets (Taken 11/20/2024 0817)  Achieves stable or improved neurological status:   Assess for and report changes in neurological status   Initiate measures to prevent increased intracranial pressure   Maintain blood pressure and fluid volume within ordered parameters to optimize cerebral perfusion and minimize risk of hemorrhage   Monitor temperature, glucose, and sodium. Initiate appropriate interventions as ordered  Goal: Achieves maximal functionality and self

## 2024-11-21 VITALS
BODY MASS INDEX: 40.66 KG/M2 | HEART RATE: 76 BPM | RESPIRATION RATE: 18 BRPM | WEIGHT: 253 LBS | OXYGEN SATURATION: 97 % | DIASTOLIC BLOOD PRESSURE: 115 MMHG | SYSTOLIC BLOOD PRESSURE: 168 MMHG | HEIGHT: 66 IN | TEMPERATURE: 98.6 F

## 2024-11-21 PROBLEM — E66.01 OBESITIES, MORBID: Status: ACTIVE | Noted: 2024-11-21

## 2024-11-21 PROBLEM — F17.200 SMOKER: Chronic | Status: ACTIVE | Noted: 2024-11-21

## 2024-11-21 PROBLEM — L73.9 FOLLICULITIS: Status: ACTIVE | Noted: 2024-11-21

## 2024-11-21 PROBLEM — E66.01 MORBID OBESITY DUE TO EXCESS CALORIES: Chronic | Status: ACTIVE | Noted: 2024-11-21

## 2024-11-21 LAB
ALBUMIN SERPL-MCNC: 3.4 G/DL (ref 3.4–5)
ALBUMIN/GLOB SERPL: 0.8 (ref 0.8–1.7)
ALP SERPL-CCNC: 102 U/L (ref 45–117)
ALT SERPL-CCNC: 23 U/L (ref 16–61)
ANION GAP SERPL CALC-SCNC: 5 MMOL/L (ref 3–18)
AST SERPL-CCNC: 13 U/L (ref 10–38)
BILIRUB SERPL-MCNC: 0.8 MG/DL (ref 0.2–1)
BUN SERPL-MCNC: 5 MG/DL (ref 7–18)
BUN/CREAT SERPL: 5 (ref 12–20)
CALCIUM SERPL-MCNC: 9.4 MG/DL (ref 8.5–10.1)
CHLORIDE SERPL-SCNC: 107 MMOL/L (ref 100–111)
CO2 SERPL-SCNC: 27 MMOL/L (ref 21–32)
CREAT SERPL-MCNC: 0.94 MG/DL (ref 0.6–1.3)
ERYTHROCYTE [DISTWIDTH] IN BLOOD BY AUTOMATED COUNT: 12.6 % (ref 11.6–14.5)
GLOBULIN SER CALC-MCNC: 4.1 G/DL (ref 2–4)
GLUCOSE SERPL-MCNC: 98 MG/DL (ref 74–99)
HCT VFR BLD AUTO: 47.3 % (ref 36–48)
HGB BLD-MCNC: 15.5 G/DL (ref 13–16)
MAGNESIUM SERPL-MCNC: 2 MG/DL (ref 1.6–2.6)
MCH RBC QN AUTO: 27.8 PG (ref 24–34)
MCHC RBC AUTO-ENTMCNC: 32.8 G/DL (ref 31–37)
MCV RBC AUTO: 84.9 FL (ref 78–100)
NRBC # BLD: 0 K/UL (ref 0–0.01)
NRBC BLD-RTO: 0 PER 100 WBC
PLATELET # BLD AUTO: 231 K/UL (ref 135–420)
PMV BLD AUTO: 11.8 FL (ref 9.2–11.8)
POTASSIUM SERPL-SCNC: 4.1 MMOL/L (ref 3.5–5.5)
PROT SERPL-MCNC: 7.5 G/DL (ref 6.4–8.2)
RBC # BLD AUTO: 5.57 M/UL (ref 4.35–5.65)
SODIUM SERPL-SCNC: 139 MMOL/L (ref 136–145)
WBC # BLD AUTO: 6.4 K/UL (ref 4.6–13.2)

## 2024-11-21 PROCEDURE — 83735 ASSAY OF MAGNESIUM: CPT

## 2024-11-21 PROCEDURE — 6360000002 HC RX W HCPCS: Performed by: PHYSICIAN ASSISTANT

## 2024-11-21 PROCEDURE — 99239 HOSP IP/OBS DSCHRG MGMT >30: CPT | Performed by: INTERNAL MEDICINE

## 2024-11-21 PROCEDURE — 85027 COMPLETE CBC AUTOMATED: CPT

## 2024-11-21 PROCEDURE — 6370000000 HC RX 637 (ALT 250 FOR IP): Performed by: PHYSICIAN ASSISTANT

## 2024-11-21 PROCEDURE — 80053 COMPREHEN METABOLIC PANEL: CPT

## 2024-11-21 PROCEDURE — 6370000000 HC RX 637 (ALT 250 FOR IP): Performed by: INTERNAL MEDICINE

## 2024-11-21 PROCEDURE — 99223 1ST HOSP IP/OBS HIGH 75: CPT | Performed by: PSYCHIATRY & NEUROLOGY

## 2024-11-21 PROCEDURE — 36415 COLL VENOUS BLD VENIPUNCTURE: CPT

## 2024-11-21 RX ORDER — DICLOXACILLIN SODIUM 500 MG/1
500 CAPSULE ORAL 3 TIMES DAILY
Qty: 21 CAPSULE | Refills: 0 | Status: SHIPPED | OUTPATIENT
Start: 2024-11-21 | End: 2024-11-28

## 2024-11-21 RX ORDER — ASPIRIN 325 MG
325 TABLET ORAL DAILY
Status: DISCONTINUED | OUTPATIENT
Start: 2024-11-21 | End: 2024-11-21 | Stop reason: HOSPADM

## 2024-11-21 RX ORDER — ASPIRIN 325 MG
325 TABLET ORAL DAILY
Qty: 30 TABLET | Refills: 3 | Status: SHIPPED | OUTPATIENT
Start: 2024-11-21

## 2024-11-21 RX ORDER — METFORMIN HYDROCHLORIDE 500 MG/1
500 TABLET, EXTENDED RELEASE ORAL
Qty: 90 TABLET | Refills: 2 | Status: SHIPPED | OUTPATIENT
Start: 2024-11-21

## 2024-11-21 RX ORDER — DICLOXACILLIN SODIUM 250 MG/1
500 CAPSULE ORAL 3 TIMES DAILY
Status: DISCONTINUED | OUTPATIENT
Start: 2024-11-21 | End: 2024-11-21 | Stop reason: HOSPADM

## 2024-11-21 RX ORDER — LOSARTAN POTASSIUM 50 MG/1
50 TABLET ORAL DAILY
Qty: 30 TABLET | Refills: 3 | Status: SHIPPED | OUTPATIENT
Start: 2024-11-22

## 2024-11-21 RX ORDER — NIFEDIPINE 30 MG/1
30 TABLET, EXTENDED RELEASE ORAL DAILY
Qty: 30 TABLET | Refills: 3 | Status: SHIPPED | OUTPATIENT
Start: 2024-11-22

## 2024-11-21 RX ORDER — ATORVASTATIN CALCIUM 80 MG/1
80 TABLET, FILM COATED ORAL NIGHTLY
Qty: 30 TABLET | Refills: 3 | Status: SHIPPED | OUTPATIENT
Start: 2024-11-21

## 2024-11-21 RX ORDER — LOSARTAN POTASSIUM 50 MG/1
50 TABLET ORAL DAILY
Status: DISCONTINUED | OUTPATIENT
Start: 2024-11-21 | End: 2024-11-21 | Stop reason: HOSPADM

## 2024-11-21 RX ADMIN — LOSARTAN POTASSIUM 50 MG: 50 TABLET, FILM COATED ORAL at 10:30

## 2024-11-21 RX ADMIN — ASPIRIN 81 MG CHEWABLE TABLET 81 MG: 81 TABLET CHEWABLE at 09:22

## 2024-11-21 RX ADMIN — NIFEDIPINE 30 MG: 30 TABLET, FILM COATED, EXTENDED RELEASE ORAL at 09:22

## 2024-11-21 RX ADMIN — ENOXAPARIN SODIUM 30 MG: 100 INJECTION SUBCUTANEOUS at 09:22

## 2024-11-21 ASSESSMENT — PAIN SCALES - GENERAL
PAINLEVEL_OUTOF10: 0
PAINLEVEL_OUTOF10: 0

## 2024-11-21 NOTE — PROGRESS NOTES
4 Eyes Skin Assessment     NAME:  Lisa Aguilar  YOB: 1990  MEDICAL RECORD NUMBER:  [id number]    The patient is being assessed for  Shift Handoff    I agree that at least one RN has performed a thorough Head to Toe Skin Assessment on the patient. ALL assessment sites listed below have been assessed.      Areas assessed by both nurses:    Head, Face, Ears, Shoulders, Back, Chest, Arms, Elbows, Hands, Sacrum. Buttock, Coccyx, Ischium, Legs. Feet and Heels, and Under Medical Devices         Does the Patient have a Wound? No noted wound(s)       Nitin Prevention initiated by RN: No  Wound Care Orders initiated by RN: No    Pressure Injury (Stage 3,4, Unstageable, DTI, NWPT, and Complex wounds) if present, place Wound referral order by RN under : No    New Ostomies, if present place, Ostomy referral order under : No     Nurse 1 eSignature: Electronically signed by Zoey Mendiola RN on 11/21/24 at 7:38 AM EST    **SHARE this note so that the co-signing nurse can place an eSignature**    Nurse 2 eSignature: {Esignature:[id number]}

## 2024-11-21 NOTE — PLAN OF CARE
Problem: Discharge Planning  Goal: Discharge to home or other facility with appropriate resources  11/21/2024 1228 by Evangelista Moore RN  Outcome: Adequate for Discharge  11/21/2024 1217 by Bhavani Valles GN  Outcome: Progressing  Flowsheets (Taken 11/21/2024 0929)  Discharge to home or other facility with appropriate resources:   Arrange for needed discharge resources and transportation as appropriate   Identify barriers to discharge with patient and caregiver     Problem: Safety - Adult  Goal: Free from fall injury  11/21/2024 1228 by Evangelista Moore RN  Outcome: Adequate for Discharge  11/21/2024 1217 by Bhavani Valles GN  Outcome: Progressing  Flowsheets (Taken 11/21/2024 1217)  Free From Fall Injury: Instruct family/caregiver on patient safety  Note: Patient will be free from injury during hospital stay. Nurse will put bed alarm on, call light within reach and do hourly visual check up     Problem: Chronic Conditions and Co-morbidities  Goal: Patient's chronic conditions and co-morbidity symptoms are monitored and maintained or improved  11/21/2024 1217 by Bhavani Valles GN  Outcome: Completed  11/21/2024 1048 by Bhavani Valles GN  Outcome: Progressing  Flowsheets  Taken 11/21/2024 1048  Care Plan - Patient's Chronic Conditions and Co-Morbidity Symptoms are Monitored and Maintained or Improved:   Monitor and assess patient's chronic conditions and comorbid symptoms for stability, deterioration, or improvement   Collaborate with multidisciplinary team to address chronic and comorbid conditions and prevent exacerbation or deterioration  Taken 11/21/2024 0929  Care Plan - Patient's Chronic Conditions and Co-Morbidity Symptoms are Monitored and Maintained or Improved:   Monitor and assess patient's chronic conditions and comorbid symptoms for stability, deterioration, or improvement   Collaborate with multidisciplinary team to address chronic and comorbid conditions and prevent exacerbation or

## 2024-11-21 NOTE — PLAN OF CARE
Problem: Chronic Conditions and Co-morbidities  Goal: Patient's chronic conditions and co-morbidity symptoms are monitored and maintained or improved  11/21/2024 1217 by Bhavani Valles GN  Outcome: Completed     Problem: Discharge Planning  Goal: Discharge to home or other facility with appropriate resources  Outcome: Progressing  Flowsheets (Taken 11/21/2024 0929)  Discharge to home or other facility with appropriate resources:   Arrange for needed discharge resources and transportation as appropriate   Identify barriers to discharge with patient and caregiver     Problem: Cardiovascular - Adult  Goal: Maintains optimal cardiac output and hemodynamic stability  Outcome: Completed  Flowsheets (Taken 11/21/2024 0929)  Maintains optimal cardiac output and hemodynamic stability:   Monitor blood pressure and heart rate   Assess for signs of decreased cardiac output     Problem: Cardiovascular - Adult  Goal: Absence of cardiac dysrhythmias or at baseline  Outcome: Completed  Flowsheets (Taken 11/21/2024 0929)  Absence of cardiac dysrhythmias or at baseline:   Monitor cardiac rate and rhythm   Assess for signs of decreased cardiac output   Administer antiarrhythmia medication and electrolyte replacement as ordered     Problem: Neurosensory - Adult  Goal: Achieves stable or improved neurological status  Outcome: Completed  Flowsheets (Taken 11/21/2024 0929)  Achieves stable or improved neurological status: Assess for and report changes in neurological status     Problem: Neurosensory - Adult  Goal: Achieves maximal functionality and self care  Outcome: Completed  Flowsheets (Taken 11/21/2024 0929)  Achieves maximal functionality and self care:   Monitor swallowing and airway patency with patient fatigue and changes in neurological status   Encourage and assist patient to increase activity and self care with guidance from physical therapy/occupational therapy     Problem: Pain  Goal: Verbalizes/displays adequate comfort  level or baseline comfort level  Outcome: Completed     Problem: Safety - Adult  Goal: Free from fall injury  Outcome: Progressing  Flowsheets (Taken 11/21/2024 1217)  Free From Fall Injury: Instruct family/caregiver on patient safety  Note: Patient will be free from injury during hospital stay. Nurse will put bed alarm on, call light within reach and do hourly visual check up     Problem: Skin/Tissue Integrity  Goal: Absence of new skin breakdown  Description: 1.  Monitor for areas of redness and/or skin breakdown  2.  Assess vascular access sites hourly  3.  Every 4-6 hours minimum:  Change oxygen saturation probe site  4.  Every 4-6 hours:  If on nasal continuous positive airway pressure, respiratory therapy assess nares and determine need for appliance change or resting period.  Outcome: Completed

## 2024-11-21 NOTE — CARE COORDINATION
D/C order noted for today. Orders reviewed. Indigent medication form faxed to outpatient pharmacy.  remains available if needed.    Natasha Tay, JOSÉ ANTONION, RN  Case Management   [phone]

## 2024-11-21 NOTE — DISCHARGE SUMMARY
Hospitalist Discharge Summary     Patient ID:  Lisa Aguilar  [id number]  34 y.o.  1990    PCP on record: No primary care provider on file.    Admit date: 11/19/2024  Discharge date and time: 11/21/2024    Discharge Diagnoses:  and hospital course;           Lisa Aguilar is a 34 y.o. male with a PMHx of HTN who presented to the ED with c/o LUE numbness that started last night while he was on the toilet associated with aphasia/slurred speech. He states he was playing a game on his phone when he noticed his arm become numb and the dexterity decrease in his hand. He tried to call his family, but he was unable to type on his phone. He tried to say \"ok google\" to try to call someone by voice commands, but he was unable to speak/get the words out so it made sense. When he tried to get up, he felt off balance. Symptoms lasted a few minutes and resolved. He does not have a primary care physician or take any home medications, but was told previously he has HTN. His grandparents and mother had HTN and ESRD on dialysis. He denies vision changes, headache, LE weakness/numbness, vertigo. He does not have a PCP. He drinks occasionally, smokes black and milds.     In the ED, BP elevated up to 223/128. Labs with no acute abnormalities. CTH negative for intracranial abnormality. CTA negative for LVO.     Patient was started on aspirin statin and blood pressure meds.    Patient's MRI results as stated below.    Patient is now back to baseline walking around left arm weakness resolved he is stable to be discharged with the below stated medication;                                           A/p;    Acute right-sided stroke on MRI'  1.  Tiny late acute/early subacute infarct in the right postcentral parietal  lobe which corresponds with the symptoms of left arm numbness.  2.  Minimal amount of nonspecific cerebral white matter foci.  3.  Mucus retention cysts and/or polyps in the maxillary sinuses.    CTA

## 2024-11-21 NOTE — PLAN OF CARE
Problem: Chronic Conditions and Co-morbidities  Goal: Patient's chronic conditions and co-morbidity symptoms are monitored and maintained or improved  Recent Flowsheet Documentation  Taken 11/20/2024 2000 by Zoey Mendiola RN  Care Plan - Patient's Chronic Conditions and Co-Morbidity Symptoms are Monitored and Maintained or Improved: Monitor and assess patient's chronic conditions and comorbid symptoms for stability, deterioration, or improvement  11/20/2024 1753 by Wanda Washington RN  Outcome: Progressing  Flowsheets (Taken 11/19/2024 1513 by Mariel Joyce RN)  Care Plan - Patient's Chronic Conditions and Co-Morbidity Symptoms are Monitored and Maintained or Improved:   Monitor and assess patient's chronic conditions and comorbid symptoms for stability, deterioration, or improvement   Collaborate with multidisciplinary team to address chronic and comorbid conditions and prevent exacerbation or deterioration   Update acute care plan with appropriate goals if chronic or comorbid symptoms are exacerbated and prevent overall improvement and discharge  11/20/2024 0817 by Chelle Palmer RN  Outcome: Progressing  Flowsheets (Taken 11/19/2024 1513 by Mariel Joyce RN)  Care Plan - Patient's Chronic Conditions and Co-Morbidity Symptoms are Monitored and Maintained or Improved:   Monitor and assess patient's chronic conditions and comorbid symptoms for stability, deterioration, or improvement   Collaborate with multidisciplinary team to address chronic and comorbid conditions and prevent exacerbation or deterioration   Update acute care plan with appropriate goals if chronic or comorbid symptoms are exacerbated and prevent overall improvement and discharge  Note: Monitor pt's bp and administer bp medication.  11/20/2024 0814 by Chelle Palmer RN  Outcome: Progressing     Problem: Discharge Planning  Goal: Discharge to home or other facility with appropriate resources  Recent Flowsheet Documentation  Taken 11/20/2024 2000 by  neurological status: Assess for and report changes in neurological status  11/20/2024 1753 by Wanda Washington RN  Outcome: Progressing  Flowsheets (Taken 11/20/2024 0817 by Chelle Palmer RN)  Achieves stable or improved neurological status:   Assess for and report changes in neurological status   Initiate measures to prevent increased intracranial pressure   Maintain blood pressure and fluid volume within ordered parameters to optimize cerebral perfusion and minimize risk of hemorrhage   Monitor temperature, glucose, and sodium. Initiate appropriate interventions as ordered  11/20/2024 0817 by Chelle Palmer RN  Outcome: Progressing  Flowsheets (Taken 11/20/2024 0817)  Achieves stable or improved neurological status:   Assess for and report changes in neurological status   Initiate measures to prevent increased intracranial pressure   Maintain blood pressure and fluid volume within ordered parameters to optimize cerebral perfusion and minimize risk of hemorrhage   Monitor temperature, glucose, and sodium. Initiate appropriate interventions as ordered  Goal: Achieves maximal functionality and self care  11/20/2024 1753 by Wanda Washington RN  Outcome: Progressing  Flowsheets (Taken 11/20/2024 1753)  Achieves maximal functionality and self care:   Monitor swallowing and airway patency with patient fatigue and changes in neurological status   Encourage and assist patient to increase activity and self care with guidance from physical therapy/occupational therapy     Problem: Safety - Adult  Goal: Free from fall injury  11/20/2024 2152 by Zoey Mendiola, RN  Outcome: Progressing  Flowsheets (Taken 11/20/2024 2000)  Free From Fall Injury: Instruct family/caregiver on patient safety  11/20/2024 1753 by Wanda Washington RN  Outcome: Progressing  Flowsheets (Taken 11/20/2024 1753)  Free From Fall Injury: Instruct family/caregiver on patient safety  11/20/2024 0817 by Chelle Palmer RN  Flowsheets (Taken 11/20/2024 0817)  Free

## 2024-11-27 NOTE — PROGRESS NOTES
Physician Progress Note      PATIENT:               SUZAN BARROS  CSN #:                  [id number]  :                       1990  ADMIT DATE:       2024 11:33 AM  DISCH DATE:        2024 1:46 PM  RESPONDING  PROVIDER #:        Josiah Carr MD          QUERY TEXT:      Dear attending,    Pt admitted with a diagnosis of CVA  Pt noted to have documentation of   Hypertensive emergency per the ED MD with BP of 223/128, 205/133 on  and   received 2 doses of IV Normadyne    If possible, please document in progress notes and discharge summary if you   are evaluating and/or treating any of the following:    The medical record reflects the following:  Risk Factors: hx. HTN not on any antihypertensives PTA  Clinical Indicators: - 223/128, 205/133  Per ED MD-  Hypertensive emergency  -Per neurology- PMHx of HTN not on any antihypertensives who came to   hospital for recurrent numbness at left hand/fingers.  -Per PN- Pressure still elevated starting low-dose Procardia;  Accelerated hypertension continue low-dose Procardia XL    Treatment: Monitor vital signs, imaging, neuro status, IV Normadyne 10 mg x 2   on   Options provided:  -- Hypertensive Emergency  -- Hypertensive Urgency  -- Other - I will add my own diagnosis  -- Disagree - Not applicable / Not valid  -- Disagree - Clinically unable to determine / Unknown  -- Refer to Clinical Documentation Reviewer    PROVIDER RESPONSE TEXT:    This patient has hypertensive urgency.    Query created by: Letty Zambrano on 2024 9:30 AM      QUERY TEXT:    Patient admitted with BMI of 41.    If possible, please document in progress notes and discharge summary if you   are evaluating and /or treating any of the following:    The medical record reflects the following:  Risk Factors: Admit BMI 41  Clinical Indicators:- BMI 40.84 kg/m?  Treatment: Monitor weight      Specificity of obesity and morbid obesity should be reported